# Patient Record
Sex: MALE | Race: WHITE
[De-identification: names, ages, dates, MRNs, and addresses within clinical notes are randomized per-mention and may not be internally consistent; named-entity substitution may affect disease eponyms.]

---

## 2019-11-07 PROBLEM — Z00.00 ENCOUNTER FOR PREVENTIVE HEALTH EXAMINATION: Status: ACTIVE | Noted: 2019-11-07

## 2019-11-08 ENCOUNTER — APPOINTMENT (OUTPATIENT)
Dept: SURGICAL ONCOLOGY | Facility: CLINIC | Age: 64
End: 2019-11-08
Payer: COMMERCIAL

## 2019-11-08 VITALS
OXYGEN SATURATION: 100 % | TEMPERATURE: 97.7 F | WEIGHT: 193 LBS | BODY MASS INDEX: 27.02 KG/M2 | HEART RATE: 77 BPM | RESPIRATION RATE: 17 BRPM | DIASTOLIC BLOOD PRESSURE: 76 MMHG | HEIGHT: 71 IN | SYSTOLIC BLOOD PRESSURE: 115 MMHG

## 2019-11-08 DIAGNOSIS — M47.816 SPONDYLOSIS W/OUT MYELOPATHY OR RADICULOPATHY, LUMBAR REGION: ICD-10-CM

## 2019-11-08 DIAGNOSIS — Z87.442 PERSONAL HISTORY OF URINARY CALCULI: ICD-10-CM

## 2019-11-08 DIAGNOSIS — C25.9 MALIGNANT NEOPLASM OF PANCREAS, UNSPECIFIED: ICD-10-CM

## 2019-11-08 PROCEDURE — 99204 OFFICE O/P NEW MOD 45 MIN: CPT

## 2019-11-11 PROBLEM — M47.816 DJD (DEGENERATIVE JOINT DISEASE), LUMBAR: Status: ACTIVE | Noted: 2019-11-11

## 2019-11-11 PROBLEM — C25.9 PANCREAS CANCER: Status: ACTIVE | Noted: 2019-11-11

## 2019-11-11 NOTE — REASON FOR VISIT
[Initial Consultation] : an initial consultation for [Spouse] : spouse [Pancreatic Cancer] : pancreatic cancer

## 2019-11-14 PROBLEM — Z87.442 HISTORY OF KIDNEY STONES: Status: ACTIVE | Noted: 2019-11-11

## 2019-11-14 NOTE — HISTORY OF PRESENT ILLNESS
[Jaundice] : jaundice [Weight loss] : weight loss [Darker Urine] : dark urine [Acholic stools] : acholic stools [ERCP] : ERCP [Endostent] : endostent [EUS] : EUS [Biopsy] : biopsy performed [de-identified] : Mr. PRABHJOT AMOR is a 64 year old male who presents today for an initial consultation. October 2019 presented with new onset jaundice and weight loss CT 10/13/19 demonstrating moderate intra and extrahepatic biliary dilation and mild pancreatic ductal dilation although no mass was seen. 10/15/19 underwent an ERCP with stent placement for a malignant appearing distal CBD stricture and EUS FNA on pancreatic head mass confirming adenocarcinoma.  Jaundice is resolving and is otherwise asymptomatic. [TextBox_4] : 10/15/19 [TextBox_21] : 32 [TextBox_6] : PDAC [TextBox_43] : 10/15/19 [TextBox_41] : pancreas head mass- adenocarcinoma [Neoadjuvant Chemotherapy] : Patient has not had neoadjuvant chemotherapy [History of Neoadjuvant Therapy] : Patient has no history of neoadjuvant therapy

## 2019-11-14 NOTE — ASSESSMENT
[FreeTextEntry1] : Mr. PRABHJOT AMOR is a 64 year old male who presents today for an initial consultation. presented in October 2019 with new onset jaundice and weight loss.  discussed results of CT on 10/13/19 demonstrating moderate intra and extrahepatic biliary dilation and mild pancreatic ductal dilation. He underwent a 10/15/19 underwent an ERCP with stent placement for a malignant appearing distal CBD stricture and EUS FNA on pancreatic head mass confirming adenocarcinoma.\par \par Reviewed imaging with Mr. AMOR. Due to the location and size I recommend a pancreaticoduodenectomy (Whipple) procedure at this time. Bam a diagram for the patient and family to better understand the operation. The procedure and associated risks, benefits and possible complications were discussed and all questions were answered. The procedure involves removal of the gallbladder, head of the pancreas, bile duct, duodenum and sometimes a small portion of the stomach and will last 4-6 hours with a hospital stay of about one week. We discussed that the most common complaint after this procedure is fatigue followed by poor appetite. It will take approximately 8-12 weeks to feel "back to normal" after the operation. Risks, benefits and details of the operation were discussed. These include bleeding, infection, damage to surrounding structures, chyle leakage, delayed gastric emptying, cardiopulmonary complications and the risks of anesthesia. Mortality quoted at <1.5%. Discussed that adjuvant treatment including additional chemotherapy/ radiation therapy will be determined by final pathology. The patient will call the office if he wishes to proceed with surgery.\par  \par Today, I personally spent 45 minutes in direct face to face time with the patient, of which greater than 50% of the time was spent in patient education and counseling as described above.\par

## 2019-11-14 NOTE — PHYSICAL EXAM
[FreeTextEntry1] : General: No acute distress. Well nourished and well kempt.\par Head: AT/NC\par Eyes: PERRL. EOMI. scleral icterus.\par Pulmonary: No respiratory distress. Clear to auscultation bilaterally. No wheezes, rales, or rhonchi. No tachypnea and no retractions. \par CV: Regular rate and rhythm. Normal S1, S2. No murmurs, rubs, clicks or gallops. No chest wall tenderness. Distal pulses intact. Good capillary refill.\par ABD: Soft. NT/ND. No rebound, no guarding, no rigidity. No peritoneal signs. No masses. \par Extremities: Warm. No edema. 2+ pulses.\par Neurological: A&O x 4.\par Psychiatric: Normal affect and mood.\par